# Patient Record
Sex: FEMALE | Race: BLACK OR AFRICAN AMERICAN | NOT HISPANIC OR LATINO | Employment: STUDENT | URBAN - METROPOLITAN AREA
[De-identification: names, ages, dates, MRNs, and addresses within clinical notes are randomized per-mention and may not be internally consistent; named-entity substitution may affect disease eponyms.]

---
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
## 2023-11-24 ENCOUNTER — NURSE TRIAGE (OUTPATIENT)
Dept: NURSING | Facility: CLINIC | Age: 23
End: 2023-11-24
Payer: COMMERCIAL

## 2023-11-24 NOTE — TELEPHONE ENCOUNTER
"Triage Call:     Pt and her mother calling to report that she has had a rash since yesterday.      She has a \"few dots\" that appeared on her skin, but now they are on her chest, legs, arms.   The rash is raised and Itchy  She also has a scabbed area on her arm that she is not sure of the cause    No fever    Disposition: See in office today or tomorrow. [place] [facility] [place] [facility] to check appt availability.     Kelly Ragland RN  [facility] Nurse Advisor 10:26 AM 11/24/2023      Reason for Disposition   Mild widespread rash  (Exception: Heat rash lasting 3 days or less.)    Additional Information   Negative: Sudden onset of rash (within last 2 hours) and difficulty with breathing or swallowing   Negative: Difficult to awaken or acting confused (e.g., disoriented, slurred speech)   Negative: Fever and purple or blood-colored spots or dots   Negative: Too weak or sick to stand   Negative: Life-threatening reaction (anaphylaxis) in the past to similar substance (e.g., food, insect bite/sting, chemical, etc.) and < 2 hours since exposure   Negative: Sounds like a life-threatening emergency to the triager   Negative: Drug rash suspected and started taking new medicine within last 2 weeks  (Exception: Antihistamine, eye drops, ear drops, decongestant or other OTC cough/cold medicines.)   Negative: Hives suspected   Negative: Insect bites suspected   Negative: MPOX SUSPECTED (e.g., direct skin contact such as sex, recent travel to [place] or [place]) and any SYMPTOMS OF MPOX (e.g., rash, fever, muscle aches, or swollen lymph nodes)   Negative: At risk for Mpox (men-who-have-sex-with-men) and possible exposure (e.g., multiple sex partners in past 21 days) and ANY SYMPTOMS OF MPOX (e.g., rash, fever, muscle aches, or swollen lymph nodes)   Negative: Sunburn suspected   Negative: Bright red, sunburn-like rash and current tampon use or nasal packing   Negative: " Bright red, sunburn-like rash and wound infection or recent surgery   Negative: Stiff neck (can't touch chin to chest)   Negative: Patient sounds very sick or weak to the triager   Negative: Bright red skin that peels off in sheets   Negative: Fever   Negative: Face becomes swollen   Negative: Headache   Negative: Purple or blood-colored spots or dots (no fever and sounds well to triager)   Negative: Bloody crusts on lips or sores in mouth   Negative: Large or small blisters on skin (i.e., fluid filled bubbles or sacs)   Negative: Joint pain or swelling   Negative: Pregnant   Negative: Rash began within 4 hours of a new prescription medication   Negative: SEVERE itching   Negative: Sore throat   Negative: Ring-like appearance of rash (or ask: does it look like a 'target' or 'bulls-eye')   Negative: Patient wants to be seen    Protocols used: Rash or Redness - Widespread-A-OH  Kelly Ragland RN  [facility] Nurse Advisor 10:26 AM 11/24/2023   Purple DH (Discharge Huddle; Vulnerable Patient)

## 2023-11-29 ENCOUNTER — OFFICE VISIT (OUTPATIENT)
Dept: URGENT CARE | Facility: URGENT CARE | Age: 23
End: 2023-11-29
Payer: COMMERCIAL

## 2023-11-29 VITALS
BODY MASS INDEX: 26.23 KG/M2 | RESPIRATION RATE: 22 BRPM | OXYGEN SATURATION: 100 % | DIASTOLIC BLOOD PRESSURE: 72 MMHG | SYSTOLIC BLOOD PRESSURE: 112 MMHG | TEMPERATURE: 98 F | WEIGHT: 154.6 LBS | HEART RATE: 70 BPM

## 2023-11-29 DIAGNOSIS — L42 PITYRIASIS ROSEA: Primary | ICD-10-CM

## 2023-11-29 PROCEDURE — 99213 OFFICE O/P EST LOW 20 MIN: CPT | Performed by: STUDENT IN AN ORGANIZED HEALTH CARE EDUCATION/TRAINING PROGRAM

## 2023-11-29 RX ORDER — TRIAMCINOLONE ACETONIDE 1 MG/G
CREAM TOPICAL 2 TIMES DAILY
Qty: 80 G | Refills: 0 | Status: SHIPPED | OUTPATIENT
Start: 2023-11-29

## 2023-11-29 NOTE — PROGRESS NOTES
ASSESSMENT & PLAN:   Diagnoses and all orders for this visit:  Pityriasis rosea  -     Adult Dermatology  Referral; Future  -     triamcinolone (KENALOG) 0.1 % external cream; Apply topically 2 times daily    Diffuse rash x 1 week consistent with pityriasis rosea - discussed oatmeal baths, triamcinolone on pruritic lesions as needed. Discussed typical course that symptoms may last 4-6 weeks. Follow-up with dermatology if concern persists - referral placed.     At the end of the encounter, I discussed results, diagnosis, medications. Discussed red flags for immediate return to clinic/ER, as well as indications for follow up if no improvement. Patient and/or caregiver understood and agreed to plan. Patient was stable for discharge.    There are no Patient Instructions on file for this visit.    ------------------------------------------------------------------------  SUBJECTIVE  History was obtained from patient and patient's mother.    Patient presents with:  Rash: All over her body 1 week- children's benadryl did not help    HPI  Stefania GARCIA Yves is a(n) 22 year old female presenting to urgent care for rash x 1 week. Started as spot on right arm and has spread to torso, bilateral arms, and bilateral legs. It is itchy, sometimes painful. Recently used a different brand laundry detergent but she has used that in the past.     Review of Systems    Current Outpatient Medications   Medication Sig Dispense Refill    triamcinolone (KENALOG) 0.1 % external cream Apply topically 2 times daily 80 g 0     Problem List:  2014-07: Keloid scar  2014-07: Overweight  2014-07: Acquired pes planus of both feet  Other developmental speech or language disorder    Allergies   Allergen Reactions    Krzysztof Flavor Other (See Comments)     Throat burning         OBJECTIVE  Vitals:    11/29/23 1350   BP: 112/72   BP Location: Right arm   Patient Position: Sitting   Cuff Size: Adult Regular   Pulse: 70   Resp: 22   Temp: 98  F (36.7   C)   TempSrc: Tympanic   SpO2: 100%   Weight: 70.1 kg (154 lb 9.6 oz)     Physical Exam   GENERAL: healthy, alert, no acute distress.   PSYCH: mentation appears normal. Normal affect  SKIN: diffuse rash on chest, abdomen, back with macules ranging from 5 mm-8 mm in diameter. Has larger scaly patch on right forearm approx 1 cm in diameter with no central clearing or drainage.     No results found for any visits on 11/29/23.

## 2023-11-30 ENCOUNTER — TELEPHONE (OUTPATIENT)
Dept: DERMATOLOGY | Facility: CLINIC | Age: 23
End: 2023-11-30
Payer: COMMERCIAL

## 2023-11-30 NOTE — TELEPHONE ENCOUNTER
Patient Contact    Attempt # 1    Was call answered?  No.  Left message on voicemail with information to call nurse back at [phone].    Patti RODRIGUEZ RN  [facility]  [phone]

## 2023-11-30 NOTE — TELEPHONE ENCOUNTER
This encounter is being sent to inform the clinic that this patient has a referral from [facility] VAL[facility] for the diagnoses of L42 (ICD-10-CM) - Pityriasis rosea and has requested that this patient be seen within 3-5 .  Based on the availability of our provider(s), we are unable to accommodate this request.    Were all sites offered this patient?  Yes    Does scheduling algorithm request to schedule next available?  Patient has been scheduled for the first available opening with Alise Turcios on 7/5/24.  We have informed the patient that the clinic will review their referral and reach out if a sooner appointment is medically necessary.

## 2023-12-04 NOTE — TELEPHONE ENCOUNTER
Per UC note- this is not urgent- patient to follow up if symptoms do not clear with prescribed treatment.    Thank you,    Ellen JOHNSTONRN BSN  [facility]- [phone]      Expand All Collapse All  ASSESSMENT & PLAN:   Diagnoses and all orders for this visit:  Pityriasis rosea  -     Adult Dermatology  Referral; Future  -     triamcinolone (KENALOG) 0.1 % external cream; Apply topically 2 times daily     Diffuse rash x 1 week consistent with pityriasis rosea - discussed oatmeal baths, triamcinolone on pruritic lesions as needed. Discussed typical course that symptoms may last 4-6 weeks. Follow-up with dermatology if concern persists - referral placed.

## 2024-07-05 ENCOUNTER — OFFICE VISIT (OUTPATIENT)
Dept: DERMATOLOGY | Facility: CLINIC | Age: 24
End: 2024-07-05
Attending: STUDENT IN AN ORGANIZED HEALTH CARE EDUCATION/TRAINING PROGRAM
Payer: COMMERCIAL

## 2024-07-05 DIAGNOSIS — L20.9 ATOPIC DERMATITIS, UNSPECIFIED TYPE: Primary | ICD-10-CM

## 2024-07-05 DIAGNOSIS — L91.0 KELOID CICATRIX: ICD-10-CM

## 2024-07-05 PROCEDURE — 99204 OFFICE O/P NEW MOD 45 MIN: CPT | Performed by: PHYSICIAN ASSISTANT

## 2024-07-05 RX ORDER — TRIAMCINOLONE ACETONIDE 1 MG/G
CREAM TOPICAL
Qty: 80 G | Refills: 2 | Status: SHIPPED | OUTPATIENT
Start: 2024-07-05

## 2024-07-05 ASSESSMENT — PAIN SCALES - GENERAL: PAINLEVEL: NO PAIN (0)

## 2024-07-05 NOTE — LETTER
7/5/2024      Stefania Marinelli  [address]  [facility] [address]      Dear Colleague,    Thank you for referring your patient, Stefania Marinelli, to the [facility]. Please see a copy of my visit note below.    HPI:   Chief complaints: Stefania Marinelli is a pleasant 23 year old female who presents for evaluation of a persistent rash on the chest. It started about 6 months ago and was on her chest and arms. She was seen and given TAC cream which she used on her arms and was helpful. She did not treat her chest.  She was using ivory soap to bathe and lubriderm after but has recently switched to Caress soap. Additionally she has a lump behind her right ear she would like checked.       PHYSICAL EXAM:    There were no vitals taken for this visit.  Skin exam performed as follows: Type 5 skin. Mood appropriate  Alert and Oriented X 3. Well developed, well nourished in no distress.  General appearance: Normal  Head including face: Normal  Eyes: conjunctiva and lids: Normal  Mouth: Lips, teeth, gums: Normal  Neck: Normal  Skin: Scalp and body hair: See below    Xerosis and dermatitis on the chest  6 mm keloid on the right posterior ear lobe     ASSESSMENT/PLAN:     Atopic dermatitis - discussed diagnosis and treatment options. Discussed daily skin care.   --Start TAC cream BID x 1-2 weeks then PRN  --Gentle soaps to bathe  --Thick emollients daily    Keloid cicatrix - discussed shave removal with ILK - she will think about this          Follow-up: PRN  CC:   Scribed By: Alise Dumont, MS, PAAGUS      Again, thank you for allowing me to participate in the care of your patient.        Sincerely,        Alise Dumont PA-C

## 2024-07-05 NOTE — PATIENT INSTRUCTIONS
Directions for eczema (atopic dermatitis)    Bathe every day - we recommend short showers or baths (5 minutes or less) with lukewarm water.  Use a gentle soap such as Dove for sensitive skin, Cetaphil, Vanicream or CeraVe   Wash the  dirty areas  only - this means armpits, groin, buttocks and feet.   After the bath or shower, within 2 minutes:   Pat dry with towel    If prescribed a topical prescription, apply this FIRST to any red/rashy/itchy areas - triamcinolone cream - you can use this twice per day for 1-2 weeks at a time when needed.     Apply a moisturizer to entire body, even where you just put the prescription medicine. We recommend CeraVe cream, Cetaphil cream, Eucerin cream, Gold Bond or Vanicream. You will do this every single day. If you don t bathe every day we still recommend an application of body moisturizer.

## 2024-07-05 NOTE — PROGRESS NOTES
HPI:   Chief complaints: Stefania Marinelli is a pleasant 23 year old female who presents for evaluation of a persistent rash on the chest. It started about 6 months ago and was on her chest and arms. She was seen and given TAC cream which she used on her arms and was helpful. She did not treat her chest.  She was using ivory soap to bathe and lubriderm after but has recently switched to Caress soap. Additionally she has a lump behind her right ear she would like checked.       PHYSICAL EXAM:    There were no vitals taken for this visit.  Skin exam performed as follows: Type 5 skin. Mood appropriate  Alert and Oriented X 3. Well developed, well nourished in no distress.  General appearance: Normal  Head including face: Normal  Eyes: conjunctiva and lids: Normal  Mouth: Lips, teeth, gums: Normal  Neck: Normal  Skin: Scalp and body hair: See below    Xerosis and dermatitis on the chest  6 mm keloid on the right posterior ear lobe     ASSESSMENT/PLAN:     Atopic dermatitis - discussed diagnosis and treatment options. Discussed daily skin care.   --Start TAC cream BID x 1-2 weeks then PRN  --Gentle soaps to bathe  --Thick emollients daily    Keloid cicatrix - discussed shave removal with ILK - she will think about this          Follow-up: PRN  CC:   Scribed By: Alise Turcios, MS, PAAGUS